# Patient Record
Sex: MALE | Race: WHITE | NOT HISPANIC OR LATINO | Employment: FULL TIME | URBAN - METROPOLITAN AREA
[De-identification: names, ages, dates, MRNs, and addresses within clinical notes are randomized per-mention and may not be internally consistent; named-entity substitution may affect disease eponyms.]

---

## 2023-10-12 ENCOUNTER — HOSPITAL ENCOUNTER (EMERGENCY)
Facility: OTHER | Age: 23
Discharge: HOME | End: 2023-10-12
Attending: EMERGENCY MEDICINE
Payer: OTHER MISCELLANEOUS

## 2023-10-12 VITALS
WEIGHT: 150 LBS | DIASTOLIC BLOOD PRESSURE: 68 MMHG | HEART RATE: 66 BPM | HEIGHT: 70 IN | SYSTOLIC BLOOD PRESSURE: 113 MMHG | OXYGEN SATURATION: 99 % | RESPIRATION RATE: 14 BRPM | BODY MASS INDEX: 21.47 KG/M2 | TEMPERATURE: 98 F

## 2023-10-12 DIAGNOSIS — F07.81 POST CONCUSSION SYNDROME: Primary | ICD-10-CM

## 2023-10-12 DIAGNOSIS — G44.209 TENSION HEADACHE: ICD-10-CM

## 2023-10-12 LAB
HCV AB SERPL QL IA: NEGATIVE
HIV 1+2 AB+HIV1 P24 AG SERPL QL IA: NEGATIVE

## 2023-10-12 PROCEDURE — 63600175 PHARM REV CODE 636 W HCPCS

## 2023-10-12 PROCEDURE — 86803 HEPATITIS C AB TEST: CPT | Performed by: EMERGENCY MEDICINE

## 2023-10-12 PROCEDURE — 25000003 PHARM REV CODE 250

## 2023-10-12 PROCEDURE — 87389 HIV-1 AG W/HIV-1&-2 AB AG IA: CPT | Performed by: EMERGENCY MEDICINE

## 2023-10-12 PROCEDURE — 99285 EMERGENCY DEPT VISIT HI MDM: CPT | Mod: 25

## 2023-10-12 PROCEDURE — 96375 TX/PRO/DX INJ NEW DRUG ADDON: CPT

## 2023-10-12 PROCEDURE — 96361 HYDRATE IV INFUSION ADD-ON: CPT

## 2023-10-12 PROCEDURE — 96374 THER/PROPH/DIAG INJ IV PUSH: CPT

## 2023-10-12 RX ORDER — ONDANSETRON 4 MG/1
4 TABLET, ORALLY DISINTEGRATING ORAL
Status: COMPLETED | OUTPATIENT
Start: 2023-10-12 | End: 2023-10-12

## 2023-10-12 RX ORDER — KETOROLAC TROMETHAMINE 10 MG/1
10 TABLET, FILM COATED ORAL
Status: COMPLETED | OUTPATIENT
Start: 2023-10-12 | End: 2023-10-12

## 2023-10-12 RX ORDER — PROCHLORPERAZINE EDISYLATE 5 MG/ML
10 INJECTION INTRAMUSCULAR; INTRAVENOUS
Status: COMPLETED | OUTPATIENT
Start: 2023-10-12 | End: 2023-10-12

## 2023-10-12 RX ORDER — ACETAMINOPHEN 500 MG
1000 TABLET ORAL
Status: COMPLETED | OUTPATIENT
Start: 2023-10-12 | End: 2023-10-12

## 2023-10-12 RX ORDER — DIPHENHYDRAMINE HYDROCHLORIDE 50 MG/ML
12.5 INJECTION INTRAMUSCULAR; INTRAVENOUS
Status: COMPLETED | OUTPATIENT
Start: 2023-10-12 | End: 2023-10-12

## 2023-10-12 RX ORDER — BUTALBITAL, ACETAMINOPHEN AND CAFFEINE 50; 325; 40 MG/1; MG/1; MG/1
1 TABLET ORAL EVERY 6 HOURS PRN
Qty: 21 TABLET | Refills: 0 | Status: SHIPPED | OUTPATIENT
Start: 2023-10-12 | End: 2023-10-19

## 2023-10-12 RX ADMIN — SODIUM CHLORIDE 1000 ML: 9 INJECTION, SOLUTION INTRAVENOUS at 07:10

## 2023-10-12 RX ADMIN — KETOROLAC TROMETHAMINE 10 MG: 10 TABLET, FILM COATED ORAL at 05:10

## 2023-10-12 RX ADMIN — ACETAMINOPHEN 1000 MG: 500 TABLET ORAL at 05:10

## 2023-10-12 RX ADMIN — PROCHLORPERAZINE EDISYLATE 10 MG: 5 INJECTION INTRAMUSCULAR; INTRAVENOUS at 07:10

## 2023-10-12 RX ADMIN — DIPHENHYDRAMINE HYDROCHLORIDE 12.5 MG: 50 INJECTION, SOLUTION INTRAMUSCULAR; INTRAVENOUS at 07:10

## 2023-10-12 RX ADMIN — ONDANSETRON 4 MG: 4 TABLET, ORALLY DISINTEGRATING ORAL at 05:10

## 2023-10-12 NOTE — ED TRIAGE NOTES
S/p [facility]I due to fall on 9/30 with lingering headaches, nausea, and fatigue. States he has not attempted OTC meds for pain b/c Tylenol and Ibuprofen have not been effective in the past for his migraines. Presents awake, alert. No distress noted.

## 2023-10-12 NOTE — FIRST PROVIDER EVALUATION
" Emergency Department TeleTriage Encounter Note      CHIEF COMPLAINT    Chief Complaint   Patient presents with    Headache     Pt states "I have a concussion and it won't go away." States he fell off of a chair from a sitting position and his his head on September 30. -LOC. C/o migraine, fatigue, nausea since then. In no acute distress at triage.        VITAL SIGNS   Initial Vitals [10/12/23 1650]   BP Pulse Resp Temp SpO2   112/67 69 18 98.1 °F (36.7 °C) 99 %      MAP       --            ALLERGIES    Review of patient's allergies indicates:  No Known Allergies    PROVIDER TRIAGE NOTE  Patient fell backwards hitting the back of his head 12 days ago. He has been having dizziness, headache, and nausea since. He has not been seen since he injury.       ORDERS  Labs Reviewed   HIV 1 / 2 ANTIBODY   HEPATITIS C ANTIBODY       ED Orders (720h ago, onward)      Start Ordered     Status Ordering Provider    10/12/23 1652 10/12/23 1651  HIV 1/2 Ag/Ab (4th Gen)  STAT         Ordered ANIBAL HERRERA    10/12/23 1652 10/12/23 1651  Hepatitis C Antibody  STAT         Ordered ANIBAL HERRERA              Virtual Visit Note: The provider triage portion of this emergency department evaluation and documentation was performed via [employer], a HIPAA-compliant telemedicine application, in concert with a tele-presenter in the room. A face to face patient evaluation with one of my colleagues will occur once the patient is placed in an emergency department room.      DISCLAIMER: This note was prepared with [employer] voice recognition transcription software. Garbled syntax, mangled pronouns, and other bizarre constructions may be attributed to that software system.    "

## 2023-10-12 NOTE — ED PROVIDER NOTES
"Encounter Date: 10/12/2023       History     Chief Complaint   Patient presents with    Headache     Pt states "I have a concussion and it won't go away." States he fell off of a chair from a sitting position and his his head on September 30. -LOC. C/o migraine, fatigue, nausea since then. In no acute distress at triage.      Julian Ley is a 23 y.o. male with history of migraines presenting to the emergency department for evaluation of persistent headaches since hitting the back of his head on 9/30/23. Patient states he was sitting in a chair when he fell backwards and hit the back of his head on the point of the windowsill. He denies LOC. He has been experiencing persistent headaches and migraines. He reports associated combination of dizziness, light-headedness, phonophobia and nausea. He has not tried OTC medications for these symptoms. He is concerned for concussion. He also reports sinus pain, pressure, congestion, and rhinorrhea for the past few days. No relief with Allegra. He denies fever, chills, weakness, paresthesias, balance issues, vision changes, photophobia, shortness of breath, or chest pain. He is not on blood thinners. No history of head injury prior to 9/30/23.       The history is provided by the patient.     Review of patient's allergies indicates:  No Known Allergies  No past medical history on file.  No past surgical history on file.  No family history on file.     Review of Systems   Constitutional:  Negative for chills and fever.   HENT:  Positive for congestion, rhinorrhea, sinus pressure and sinus pain. Negative for sore throat.    Eyes:  Negative for photophobia and visual disturbance.   Respiratory:  Negative for cough and shortness of breath.    Cardiovascular:  Negative for chest pain.   Gastrointestinal:  Positive for nausea. Negative for abdominal pain, diarrhea and vomiting.   Genitourinary:  Negative for dysuria, frequency and urgency.   Musculoskeletal:  Negative for back " pain.   Skin:  Negative for rash.   Neurological:  Positive for dizziness, light-headedness and headaches.   Psychiatric/Behavioral:  Negative for confusion.        Physical Exam     Initial Vitals [10/12/23 1650]   BP Pulse Resp Temp SpO2   112/67 69 18 98.1 °F (36.7 °C) 99 %      MAP       --         Physical Exam    Vitals reviewed.  Constitutional: He appears well-developed and well-nourished. No distress.   HENT:   Head: Normocephalic and atraumatic.   Nose: Nose normal.   Mouth/Throat: Oropharynx is clear and moist.   Eyes: Conjunctivae and EOM are normal. Pupils are equal, round, and reactive to light.   Neck: Neck supple.   Normal range of motion.  Cardiovascular:  Normal rate, regular rhythm, normal heart sounds and intact distal pulses.           Pulmonary/Chest: Breath sounds normal. No respiratory distress. He has no wheezes. He has no rhonchi. He has no rales. He exhibits no tenderness.   Abdominal: Abdomen is soft. Bowel sounds are normal. He exhibits no distension and no mass. There is no abdominal tenderness. There is no rebound and no guarding.   Musculoskeletal:         General: No edema. Normal range of motion.      Cervical back: Normal range of motion and neck supple.     Neurological: He is alert and oriented to person, place, and time. He has normal strength. No cranial nerve deficit or sensory deficit.   No focal neurological deficits.   Skin: Skin is warm and dry.   Psychiatric: He has a normal mood and affect. His behavior is normal. Judgment and thought content normal.         ED Course   Procedures  Labs Reviewed   HIV 1 / 2 ANTIBODY    Narrative:     Release to patient->Immediate   HEPATITIS C ANTIBODY    Narrative:     Release to patient->Immediate          Imaging Results              CT Head Without Contrast (Final result)  Result time 10/12/23 18:20:16      Final result by Cristy Cardoza MD (10/12/23 18:20:16)                   Impression:      No acute intracranial abnormality  detected.      Electronically signed by: Cristy Cardoza  Date:    10/12/2023  Time:    18:20               Narrative:    EXAMINATION:  CT OF THE HEAD WITHOUT    CLINICAL HISTORY:  Head trauma, moderate-severe;    TECHNIQUE:  5 mm unenhanced axial images were obtained from the skull base to the vertex.    COMPARISON:  None.    FINDINGS:  The ventricles, basal cisterns, and cortical sulci are within normal limits for patient's stated age. There is no acute intracranial hemorrhage, territorial infarct or mass effect, or midline shift. The visualized paranasal sinuses and mastoid air cells are clear.                                       Medications   acetaminophen tablet 1,000 mg (1,000 mg Oral Given 10/12/23 1748)   ketorolac tablet 10 mg (10 mg Oral Given 10/12/23 1752)   ondansetron disintegrating tablet 4 mg (4 mg Oral Given 10/12/23 1749)   sodium chloride 0.9% bolus 1,000 mL 1,000 mL (0 mLs Intravenous Stopped 10/12/23 1959)   prochlorperazine injection Soln 10 mg (10 mg Intravenous Given 10/12/23 1915)   diphenhydrAMINE injection 12.5 mg (12.5 mg Intravenous Given 10/12/23 1915)     Medical Decision Making  Amount and/or Complexity of Data Reviewed  Labs: ordered.    Risk  OTC drugs.  Prescription drug management.                          Medical Decision Making:   Initial Assessment:   Urgent evaluation of 23-year-old male with history of migraines presenting to the emergency persistent headaches, nausea, dizziness, and light-headedness since hitting the back of his head on the point of a window sill on 09/30/2023.  He denies loss of consciousness at the time.  He has been experiencing persistent headaches since then.  No treatments tried at home.  He is concerned for concussion.  On exam, he is well-appearing and nontoxic.  Hemodynamically stable.  No focal neurological deficits on exam.  Pending CT head ordered by the triage provider.  Will give Tylenol, Toradol, and Zofran and reassess.  Clinical Tests:    Radiological Study: Ordered and Reviewed  ED Management:  Patient states that his headache is about the same after receiving Toradol and Tylenol.  Will give IV fluids, Compazine, and Benadryl.     No acute intracranial abnormality on CT head.  I updated the patient on all results.  Explained that he likely has postconcussion syndrome.  States that he is feeling much better now.  Will discharge home with Fioricet as needed for headaches.  Ambulatory referral to [facility] concussion protocol program provided.  Patient advised to limit screen time and to remain hydrated.  Also recommended improving his sleep.  Patient verbalized understanding and agreement with this plan of care. He was given specific return precautions. Advised to follow up with PCP as needed. All questions and concerns addressed. He is stable for discharge.     This note was created with [employer] Direct Dictation. Please excuse any spelling or grammatical errors.      Clinical Impression:   Final diagnoses:  [F07.81] Post concussion syndrome (Primary)  [G44.209] Tension headache        ED Disposition Condition    Discharge Stable          ED Prescriptions       Medication Sig Dispense Start Date End Date Auth. Provider    butalbital-acetaminophen-caffeine -40 mg (FIORICET, ESGIC) -40 mg per tablet Take 1 tablet by mouth every 6 (six) hours as needed for Headaches. 21 tablet 10/12/2023 10/19/2023 Jarad Farmer PA-C          Follow-up Information    None          Jarad Farmer PA-C  10/12/23 2038

## 2023-10-13 ENCOUNTER — PATIENT MESSAGE (OUTPATIENT)
Dept: ADMINISTRATIVE | Facility: OTHER | Age: 23
End: 2023-10-13
Payer: COMMERCIAL

## 2023-10-13 ENCOUNTER — TELEPHONE (OUTPATIENT)
Dept: ADMINISTRATIVE | Facility: OTHER | Age: 23
End: 2023-10-13
Payer: COMMERCIAL

## 2023-10-13 NOTE — TELEPHONE ENCOUNTER
MILTON with scheduled appointment for patient to be seen with  on 10-23-23, and contact number provided for any questions. My Chart message to be sent.

## 2023-10-13 NOTE — DISCHARGE INSTRUCTIONS
Please take Fioricet as needed for your headaches.  Please make sure you stay hydrated.  Limit screen time. Get good sleep. Please follow-up with [facility] concussion program.

## 2023-10-27 ENCOUNTER — PATIENT MESSAGE (OUTPATIENT)
Dept: ADMINISTRATIVE | Facility: OTHER | Age: 23
End: 2023-10-27
Payer: COMMERCIAL

## 2023-10-27 ENCOUNTER — TELEPHONE (OUTPATIENT)
Dept: ADMINISTRATIVE | Facility: OTHER | Age: 23
End: 2023-10-27
Payer: COMMERCIAL

## 2023-10-27 NOTE — TELEPHONE ENCOUNTER
MILTON with scheduled Neurology appointment of 1-8-2024, and contact number provided for any questions. My Chart message to be sent.

## 2023-11-09 ENCOUNTER — TELEPHONE (OUTPATIENT)
Dept: NEUROLOGY | Facility: CLINIC | Age: 23
End: 2023-11-09
Payer: COMMERCIAL

## 2023-11-09 NOTE — TELEPHONE ENCOUNTER
Called Pt to reschedule his Jan 8 appt since it was scheduled incorrectly and schedule far out when there are sooner appts available. I was unable to speak with him but left a vm.

## 2023-11-10 ENCOUNTER — TELEPHONE (OUTPATIENT)
Dept: NEUROLOGY | Facility: CLINIC | Age: 23
End: 2023-11-10
Payer: COMMERCIAL